# Patient Record
Sex: FEMALE | Race: WHITE | URBAN - METROPOLITAN AREA
[De-identification: names, ages, dates, MRNs, and addresses within clinical notes are randomized per-mention and may not be internally consistent; named-entity substitution may affect disease eponyms.]

---

## 2024-04-04 ENCOUNTER — OFFICE VISIT (OUTPATIENT)
Age: 44
End: 2024-04-04
Payer: COMMERCIAL

## 2024-04-04 VITALS
DIASTOLIC BLOOD PRESSURE: 78 MMHG | HEART RATE: 98 BPM | WEIGHT: 201 LBS | BODY MASS INDEX: 30.46 KG/M2 | SYSTOLIC BLOOD PRESSURE: 124 MMHG | HEIGHT: 68 IN | OXYGEN SATURATION: 95 % | RESPIRATION RATE: 16 BRPM

## 2024-04-04 DIAGNOSIS — G43.101 MIGRAINE WITH AURA AND WITH STATUS MIGRAINOSUS, NOT INTRACTABLE: Primary | ICD-10-CM

## 2024-04-04 PROCEDURE — 99204 OFFICE O/P NEW MOD 45 MIN: CPT | Performed by: PSYCHIATRY & NEUROLOGY

## 2024-04-04 RX ORDER — ATOMOXETINE 60 MG/1
60 CAPSULE ORAL DAILY
COMMUNITY
Start: 2024-03-16

## 2024-04-04 RX ORDER — QUETIAPINE FUMARATE 50 MG/1
50 TABLET, FILM COATED ORAL DAILY
COMMUNITY
Start: 2024-03-16

## 2024-04-04 RX ORDER — BUPROPION HYDROCHLORIDE 100 MG/1
TABLET, EXTENDED RELEASE ORAL
COMMUNITY
Start: 2024-03-16

## 2024-04-04 RX ORDER — SUMATRIPTAN 100 MG/1
TABLET, FILM COATED ORAL
Qty: 9 TABLET | Refills: 3 | Status: SHIPPED | OUTPATIENT
Start: 2024-04-04

## 2024-04-04 RX ORDER — ALPRAZOLAM 0.5 MG/1
0.5 TABLET ORAL 3 TIMES DAILY PRN
COMMUNITY
Start: 2024-02-07

## 2024-04-04 ASSESSMENT — PATIENT HEALTH QUESTIONNAIRE - PHQ9
SUM OF ALL RESPONSES TO PHQ QUESTIONS 1-9: 0
SUM OF ALL RESPONSES TO PHQ QUESTIONS 1-9: 0
SUM OF ALL RESPONSES TO PHQ9 QUESTIONS 1 & 2: 0
SUM OF ALL RESPONSES TO PHQ QUESTIONS 1-9: 0
2. FEELING DOWN, DEPRESSED OR HOPELESS: NOT AT ALL
SUM OF ALL RESPONSES TO PHQ QUESTIONS 1-9: 0
1. LITTLE INTEREST OR PLEASURE IN DOING THINGS: NOT AT ALL

## 2024-04-04 NOTE — PATIENT INSTRUCTIONS
[place], [place] [facility] Test Result Communication    Test results are available in [employer].  [facility] [employer] is the patient portal into our electronic health record.  This feature allows patients to see diagnostic test results, immunizations, allergies, past medical and surgical history, current medications, and send messages directly to providers.  Our team members at the  can provide additional information and assist with registration.  The [employer] support team can be reached at [phone].    In some cases, a provider might need time to explain the results in detail during a follow-up appointment.  This might include additional information or context that will help patients understand the reason for next steps in the plan of care recommended by their provider.    If a patient chooses to receive diagnostic testing at an imaging center outside of the [facility] network, it is the patient's responsibility to bring the imaging report and disc to their [facility] follow-up appointment.    If the test results reveal anything that is particularly noteworthy, we will contact you to discuss the matter and, if necessary, schedule a follow-up appointment at an earlier date.    If you have not received your test results by [employer] or other communication within 7 days, please contact our office.  An inquiry can be sent to your provider using [employer].  Alternatively, appointments can be scheduled via telephone to review results.  If a follow-up appointment to review results has not been scheduled, Our [facility] office can be reached at [phone].  For appointments at our [place] or [place] office, please call [phone].       PRESCRIPTION REFILL POLICY    [facility]   Statement to Patients  April 1, 2014      In an effort to ensure the large volume of patient prescription refills is processed in the most efficient and expeditious
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
## 2024-06-03 ENCOUNTER — TELEPHONE (OUTPATIENT)
Age: 44
End: 2024-06-03

## 2024-06-03 DIAGNOSIS — G43.101 MIGRAINE WITH AURA AND WITH STATUS MIGRAINOSUS, NOT INTRACTABLE: Primary | ICD-10-CM

## 2024-06-03 RX ORDER — RIMEGEPANT SULFATE 75 MG/75MG
TABLET, ORALLY DISINTEGRATING ORAL
Qty: 8 TABLET | Refills: 1 | Status: SHIPPED | OUTPATIENT
Start: 2024-06-03 | End: 2024-06-04 | Stop reason: ALTCHOICE

## 2024-06-03 NOTE — TELEPHONE ENCOUNTER
Per pt- the Nurtec is $800 out of pocket for 30 day PRN supply. Advised I will put samples aside for her and  will  tomorrow.   In the meantime would like to try Maxalt as she believes insurance will cover that while we wait for [facility] PA.   Ok to send in Rizatriptan/Maxalt?

## 2024-06-03 NOTE — TELEPHONE ENCOUNTER
Please advise.     New pt saw Dr Iverson 4/4/24 for worsening migraines. Previously had Nurtec samples from another provider which were effective. Imitrex 100 mg PRN is not helping her migraine.

## 2024-06-03 NOTE — TELEPHONE ENCOUNTER
Patient would like to discuss her getting prescribed a different medication other than Sumatriptan because she feels it's not working and she is dealing with a severe migraine as we speak.    Merced is dealing with blurry vision / seeing spots / nausea / throbbing pain.

## 2024-06-04 ENCOUNTER — TELEPHONE (OUTPATIENT)
Age: 44
End: 2024-06-04

## 2024-06-04 RX ORDER — RIZATRIPTAN BENZOATE 10 MG/1
10 TABLET ORAL AS NEEDED
Qty: 9 TABLET | Refills: 3 | Status: SHIPPED | OUTPATIENT
Start: 2024-06-04

## 2024-06-04 NOTE — TELEPHONE ENCOUNTER
Re: Nurte    Rcvd PA in [facility] but pt has not tried and failed 2 triptans. Sent update to nurse as PA will deny.

## 2024-07-08 ENCOUNTER — OFFICE VISIT (OUTPATIENT)
Age: 44
End: 2024-07-08
Payer: COMMERCIAL

## 2024-07-08 ENCOUNTER — TELEPHONE (OUTPATIENT)
Age: 44
End: 2024-07-08

## 2024-07-08 VITALS
DIASTOLIC BLOOD PRESSURE: 96 MMHG | HEART RATE: 93 BPM | RESPIRATION RATE: 20 BRPM | SYSTOLIC BLOOD PRESSURE: 144 MMHG | OXYGEN SATURATION: 98 %

## 2024-07-08 DIAGNOSIS — G43.101 MIGRAINE WITH AURA AND WITH STATUS MIGRAINOSUS, NOT INTRACTABLE: Primary | ICD-10-CM

## 2024-07-08 PROCEDURE — 99214 OFFICE O/P EST MOD 30 MIN: CPT

## 2024-07-08 RX ORDER — TOPIRAMATE 25 MG/1
TABLET ORAL
Qty: 90 TABLET | Refills: 0 | Status: SHIPPED | OUTPATIENT
Start: 2024-07-08

## 2024-07-08 RX ORDER — TOPIRAMATE 100 MG/1
100 TABLET, FILM COATED ORAL
Qty: 90 TABLET | Refills: 1 | Status: SHIPPED | OUTPATIENT
Start: 2024-07-29

## 2024-07-08 RX ORDER — RIMEGEPANT SULFATE 75 MG/75MG
75 TABLET, ORALLY DISINTEGRATING ORAL PRN
Qty: 8 TABLET | Refills: 5 | Status: SHIPPED | OUTPATIENT
Start: 2024-07-08

## 2024-07-08 ASSESSMENT — ENCOUNTER SYMPTOMS
NAUSEA: 1
PHOTOPHOBIA: 1

## 2024-07-08 NOTE — TELEPHONE ENCOUNTER
RE:Nurtec 75 mg tablet prior authorization request sent to jessica through epic    Case ID: [id number]     PA Case: [id number]     Status is approved     Coverage Starts on: 7/8/2024     Coverage Ends on: 7/8/2025     Approval letter scanned to media     Detail in the referral tab     FYI to nurse

## 2024-07-08 NOTE — PROGRESS NOTES
The nurtec works better than the triptans   The Triptans still don't help all that much, she is not able to get back to her task with the nurtec she can have like a 30 min time out and she is back to doing her task     Uses the rescue maybe 1-2 a week, she does other things like her environment, wearing ear plugs     
for Migraine May repeat in 2 hours if needed 9 tablet 3    atomoxetine (STRATTERA) 60 MG capsule Take 1 capsule by mouth daily      QUEtiapine (SEROQUEL) 50 MG tablet Take 1 tablet by mouth daily       No current facility-administered medications for this visit.        Social History     Tobacco Use   Smoking Status Never    Passive exposure: Never   Smokeless Tobacco Never       Past Medical History:   Diagnosis Date    Migraines        Past Surgical History:   Procedure Laterality Date     SECTION         History reviewed. No pertinent family history.    Social History     Socioeconomic History    Marital status:      Spouse name: None    Number of children: None    Years of education: None    Highest education level: None   Tobacco Use    Smoking status: Never     Passive exposure: Never    Smokeless tobacco: Never   Vaping Use    Vaping Use: Never used   Substance and Sexual Activity    Alcohol use: Yes     Comment: rarely    Drug use: Yes     Types: Marijuana (Weed)       Review of Systems   Constitutional: Negative.    Eyes:  Positive for photophobia and visual disturbance.   Gastrointestinal:  Positive for nausea.   Neurological:  Positive for headaches.         Remainder of comprehensive review is negative.     Physical Exam :    BP (!) 144/96 (Site: Right Upper Arm, Position: Sitting, Cuff Size: Large Adult)   Pulse 93   Resp 20   SpO2 98%     General: Well defined, nourished, and groomed individual in no acute distress.    Neck: Supple, nontender, no bruits, no pain with resistance to active range of motion.    Musculoskeletal: Extremities revealed no edema and had full range of motion of joints.    Psych: Good mood and bright affect    NEUROLOGICAL EXAMINATION:    Mental Status: Alert and oriented to person, place, and time    Cranial Nerves:    II, III, IV, VI: Visual acuity grossly intact. Visual fields are normal.    Pupils are equal, round, and reactive to light and accommodation.

## 2024-08-26 ENCOUNTER — OFFICE VISIT (OUTPATIENT)
Age: 44
End: 2024-08-26
Payer: COMMERCIAL

## 2024-08-26 DIAGNOSIS — F84.0 AUTISTIC BEHAVIOR: ICD-10-CM

## 2024-08-26 DIAGNOSIS — R47.89 WORD FINDING DIFFICULTY: ICD-10-CM

## 2024-08-26 DIAGNOSIS — F32.A ANXIETY AND DEPRESSION: ICD-10-CM

## 2024-08-26 DIAGNOSIS — G31.84 MILD COGNITIVE IMPAIRMENT: Primary | ICD-10-CM

## 2024-08-26 DIAGNOSIS — R41.89 COGNITIVE DECLINE: ICD-10-CM

## 2024-08-26 DIAGNOSIS — F41.9 ANXIETY AND DEPRESSION: ICD-10-CM

## 2024-08-26 PROCEDURE — 90791 PSYCH DIAGNOSTIC EVALUATION: CPT | Performed by: CLINICAL NEUROPSYCHOLOGIST

## 2024-08-26 NOTE — PROGRESS NOTES
[facility] NEUROSCIENCE [facility] MEDICAL/EMERGENCY CENTER  NEUROLOGY CLINIC   [address]   [place] [address]   [phone] Office   [phone] Fax      Neuropsychology    Initial Diagnostic Interview Note      Referral:  [facility], Pcp    Merced Meza is a 44 y.o. right handed   female who was accompanied by her spouse to the initial clinical interview on 2024.  Please refer to her medical records for details pertaining to her history.   At the start of the appointment, I reviewed the patient's [facility] Epic Chart (including Media scanned in from previous providers) for the active Problem List, all pertinent Past Medical Hx, medications, recent radiologic and laboratory findings.  In addition, I reviewed pt's documented Immunization Record and Encounter History.     Chief Complaint: New patient, establish care, for neurocognitive and psychologic concerns, as outlined above.     The patient  has a past medical history of Migraines.    She  has a past surgical history that includes  section.      Patient was last seen 2024 by Dr. Iverson for worsening headaches for the last several years.  She was having about 2 a month that was lasting about 1 to 3 days each.  These headaches were occurring on the left side of her head and behind her left eye that was stabbing in nature with phonophobia, photophobia, nausea, smell sensitivity.  The patient sometimes has an aura of pulsing lights and has to go to bed in order to relieve these headaches.  Dr. Iverson ordered the patient to try Imitrex 100 mg for rescue therapy however, the patient messaged us Yue 3 stating that it was not helping to relieve her headaches.  Maxalt was sent in place of it on Yue 3.  Today the patient states that unfortunately the Maxalt only dulls the pain she still cannot do her daily activities.  She says that she is having most day dull headaches.  She is

## 2024-08-27 ENCOUNTER — PROCEDURE VISIT (OUTPATIENT)
Age: 44
End: 2024-08-27

## 2024-08-27 DIAGNOSIS — G31.84 MILD COGNITIVE IMPAIRMENT: Primary | ICD-10-CM

## 2024-10-02 ENCOUNTER — TELEPHONE (OUTPATIENT)
Age: 44
End: 2024-10-02

## 2024-10-02 NOTE — TELEPHONE ENCOUNTER
Spoke with patient regarding returning multiple questionnaires. She has been out of town. I informed the patient as long as she gets those to me by Monday, I can get her report completed prior to her follow up. Patient agreed to get them in ASAP.

## 2024-10-08 ENCOUNTER — TELEPHONE (OUTPATIENT)
Age: 44
End: 2024-10-08

## 2024-10-08 NOTE — TELEPHONE ENCOUNTER
Spoke with patient regarding questionnaires. The patient stated she is struggling to answer the questions, but I kept informing her the questionnaires are a rating/opinion of herself and daily behaviors so I am unable to assist her with an answer. Patient was initially supposed to take the questionnaires to therapist for guidance but she unfortunately has not had time due to travel. She stated she will try to complete them, but I informed her if she cannot get through them that I can turn her chart in without them.

## 2024-10-11 ENCOUNTER — TELEMEDICINE (OUTPATIENT)
Age: 44
End: 2024-10-11
Payer: COMMERCIAL

## 2024-10-11 DIAGNOSIS — G31.84 MILD COGNITIVE IMPAIRMENT: Primary | ICD-10-CM

## 2024-10-11 DIAGNOSIS — F41.9 SEVERE ANXIETY: ICD-10-CM

## 2024-10-11 DIAGNOSIS — F42.8 OTHER OBSESSIVE-COMPULSIVE DISORDER: ICD-10-CM

## 2024-10-11 DIAGNOSIS — F44.9 CONVERSION DISORDER: ICD-10-CM

## 2024-10-11 DIAGNOSIS — R47.89 WORD FINDING DIFFICULTY: ICD-10-CM

## 2024-10-11 DIAGNOSIS — F45.0 SOMATIZATION DISORDER: ICD-10-CM

## 2024-10-11 DIAGNOSIS — F90.0 ATTENTION DEFICIT HYPERACTIVITY DISORDER (ADHD), INATTENTIVE TYPE, MODERATE: ICD-10-CM

## 2024-10-11 DIAGNOSIS — F84.0 AUTISM SPECTRUM DISORDER REQUIRING SUPPORT (LEVEL 1): ICD-10-CM

## 2024-10-11 PROCEDURE — 96132 NRPSYC TST EVAL PHYS/QHP 1ST: CPT | Performed by: CLINICAL NEUROPSYCHOLOGIST

## 2024-10-11 NOTE — PROGRESS NOTES
Mreced Meza, was evaluated through a synchronous (real-time) audio-video encounter. The patient (or guardian if applicable) is aware that this is a billable service, which includes applicable co-pays. This Virtual Visit was conducted with patient's (and/or legal guardian's) consent. Patient identification was verified, and a caregiver was present when appropriate.   The patient was located at Home: [address] [place] [id number]  Provider was located at Facility (Appt Dept): [address] [place] [address]  [address]  [place] [address]  Confirm you are appropriately licensed, registered, or certified to deliver care in the state where the patient is located as indicated above. If you are not or unsure, please re-schedule the visit: Yes, I confirm.     Merced Meza (:  1980) is a Established patient, presenting virtually for evaluation of the following:        Chief Complaint:  Follow up to discuss test results with this established patient related to neurocognitive and psychologic functioning, cognitive concerns and emotional/mood concerns as outlined below.     A neuropsychological evaluation was completed with the patient on 2024     Prior to seeing the patient for today's visit, I reviewed pertinent records, including the previously completed report, the records in Epic, and any updated visits from other providers since the patient's last visit.     During today's appointment I administered the following measures: NMSE, Fluency.  Exam impaired in that speech worsens with heightened anxiety.          I provided feedback services related to the previously completed report. Attendees included: Patient. Education was provided regarding my diagnostic impressions, and we discussed treatment plan/options. Attendees were provided with the opportunity to ask questions, which were answered to the best of my ability.     We discussed, in detail, the following:     This patient generated

## 2024-10-21 NOTE — PROGRESS NOTES
Merced Meza is a 44 y.o. female who presents with the following  Chief Complaint   Patient presents with    Follow-up     Migraines, med eval on topamax, nurtec      Last office visit note  HPI  Patient is here today with her  for migraine follow-up.  Patient was last seen April 4, 2024 by Dr. Iverson for worsening headaches for the last several years.  She was having about 2 a month that was lasting about 1 to 3 days each.  These headaches were occurring on the left side of her head and behind her left eye that was stabbing in nature with phonophobia, photophobia, nausea, smell sensitivity.  The patient sometimes has an aura of pulsing lights and has to go to bed in order to relieve these headaches.  Dr. Iverson ordered the patient to try Imitrex 100 mg for rescue therapy however, the patient messaged us Yue 3 stating that it was not helping to relieve her headaches.  Maxalt was sent in place of it on Yue 3.  Today the patient states that unfortunately the Maxalt only dulls the pain she still cannot do her daily activities.  She says that she is having most day dull headaches.  She is having about 2 severe migraines a month that last 2 to 3 days.  Tried/failed: Prozac, Wellbutrin, Xanax,  nortriptyline, metoprolol, propranolol, is currently on Seroquel.    Patient is open to starting a preventative medication.  We will start with Topamax 25 mg in the customary fashion to 100 mg nightly.  Sent a separate 100 mg tablet for when she reaches that dose.  Discussed potential side effects.  Nurtec ODT 75 mg for rescue therapy.  Patient has tried and failed sumatriptan and rizatriptan.  Follow-up in 3 months      Today's office visit note  HPI  Patient is here today for migraine follow-up.  Since the patient was here last she did start Topamax for migraine prevention and went up to 100 mg nightly.  Unfortunately, the patient says that she experienced significant hair loss with the starting of Topamax.  She says

## 2024-10-22 ENCOUNTER — OFFICE VISIT (OUTPATIENT)
Age: 44
End: 2024-10-22
Payer: COMMERCIAL

## 2024-10-22 VITALS
SYSTOLIC BLOOD PRESSURE: 140 MMHG | RESPIRATION RATE: 20 BRPM | DIASTOLIC BLOOD PRESSURE: 80 MMHG | HEART RATE: 105 BPM | OXYGEN SATURATION: 97 %

## 2024-10-22 DIAGNOSIS — G43.101 MIGRAINE WITH AURA AND WITH STATUS MIGRAINOSUS, NOT INTRACTABLE: Primary | ICD-10-CM

## 2024-10-22 PROCEDURE — 99215 OFFICE O/P EST HI 40 MIN: CPT

## 2024-10-22 RX ORDER — FREMANEZUMAB-VFRM 225 MG/1.5ML
225 INJECTION SUBCUTANEOUS
Qty: 1.5 ML | Refills: 5 | Status: SHIPPED | OUTPATIENT
Start: 2024-10-22

## 2024-10-22 RX ORDER — PROPRANOLOL HYDROCHLORIDE 10 MG/1
10 TABLET ORAL 2 TIMES DAILY PRN
COMMUNITY
Start: 2024-08-29

## 2024-10-22 RX ORDER — RIZATRIPTAN BENZOATE 10 MG/1
10 TABLET ORAL AS NEEDED
Qty: 9 TABLET | Refills: 5 | Status: SHIPPED | OUTPATIENT
Start: 2024-10-22

## 2024-10-22 RX ORDER — BUSPIRONE HYDROCHLORIDE 15 MG/1
15 TABLET ORAL 3 TIMES DAILY
COMMUNITY

## 2024-10-22 NOTE — PROGRESS NOTES
Maybe 3 weeks ago she dropped back down to the 50 mg due to her hair falling out   She was thinking about the medication since that was the latest thing that was changed     She ran out of the Nurtec

## 2024-11-22 ENCOUNTER — TELEPHONE (OUTPATIENT)
Age: 44
End: 2024-11-22

## 2024-11-22 DIAGNOSIS — G43.101 MIGRAINE WITH AURA AND WITH STATUS MIGRAINOSUS, NOT INTRACTABLE: ICD-10-CM

## 2024-11-22 NOTE — TELEPHONE ENCOUNTER
Requesting a call back concerning PA for RX   Fremanezumab-vfrm (AJOVY) 225 MG/1.5ML SOAJ stated she is out of the medication and need a refill or simple.

## 2024-11-24 RX ORDER — FREMANEZUMAB-VFRM 225 MG/1.5ML
225 INJECTION SUBCUTANEOUS
Qty: 1.5 ML | Refills: 5 | Status: SHIPPED | OUTPATIENT
Start: 2024-11-24

## 2024-11-26 ENCOUNTER — TELEPHONE (OUTPATIENT)
Age: 44
End: 2024-11-26

## 2024-11-26 ENCOUNTER — PATIENT MESSAGE (OUTPATIENT)
Age: 44
End: 2024-11-26

## 2024-11-27 NOTE — TELEPHONE ENCOUNTER
RE:Ajovy 225 mg 1.5 ml auto injector pa request sent to [facility] via cmm     (Key: [id number])    Approved today by [employer] 2017    PA Case: [id number]    Status: Approved    Coverage Starts on: 11/27/2024     Coverage Ends on: 2/25/2025     Authorization Expiration Date: 2/24/2025se ID #: [id number]    FYI to nurse

## 2025-01-10 DIAGNOSIS — G43.101 MIGRAINE WITH AURA AND WITH STATUS MIGRAINOSUS, NOT INTRACTABLE: ICD-10-CM

## 2025-01-13 RX ORDER — TOPIRAMATE 50 MG/1
50 TABLET, FILM COATED ORAL
Qty: 90 TABLET | Refills: 0 | Status: SHIPPED | OUTPATIENT
Start: 2025-01-13

## 2025-03-03 NOTE — PROGRESS NOTES
Merced Meza is a 44 y.o. female who presents with the following  Chief Complaint   Patient presents with    Follow-up     Migraines, started on Ajovy      Last office visit note  HPI  Patient is here today for migraine follow-up.  Since the patient was here last she did start Topamax for migraine prevention and went up to 100 mg nightly.  Unfortunately, the patient says that she experienced significant hair loss with the starting of Topamax.  She says that she did lower the dose from 100 mg nightly to 50 mg nightly 3 weeks ago.  She says that the Topamax had helped to reduce her headache days from a daily headache to 3 to 4 days a week.  She currently has a migraine that started 3 days ago. The patient sometimes has an aura of pulsing lights and has to go to bed in order to relieve these headaches.  Ideally the patient would like to stop Topamax altogether.  She is interested in taking something else to prevent migraines from returning.  She has been using rizatriptan (only dulls) or Nurtec ODT for rescue therapy.     Tried/failed: Prozac, Wellbutrin, Xanax,  nortriptyline, metoprolol, propranolol, is currently on Seroquel, currently on Topamax (hair loss), Sumatriptan, Rizatriptan (current med) Nurtec (current med).    Advised the patient that we will attempt to get Ajovy 225 mg monthly approved for migraine prevention.  Discussed potential side effects.  Did give the patient a sample to start and a co-pay card.     Patient may go ahead and stop Topamax 50 mg daily or continue taking it until she knows that the Ajovy is helpful.     Continue using Nurtec ODT 75 mg and rizatriptan 10 mg as needed for rescue therapy.  Patient will return for follow-up in 4 months.      Today's office visit note  HPI  Patient is here today for migraine follow-up.  Patient has been using Ajovy for migraine prevention but unfortunately, the last 2 months she has received them about 2 weeks late from the pharmacy.  She believes

## 2025-03-04 ENCOUNTER — OFFICE VISIT (OUTPATIENT)
Age: 45
End: 2025-03-04
Payer: COMMERCIAL

## 2025-03-04 VITALS
OXYGEN SATURATION: 99 % | SYSTOLIC BLOOD PRESSURE: 134 MMHG | DIASTOLIC BLOOD PRESSURE: 86 MMHG | RESPIRATION RATE: 20 BRPM | HEART RATE: 76 BPM

## 2025-03-04 DIAGNOSIS — G43.101 MIGRAINE WITH AURA AND WITH STATUS MIGRAINOSUS, NOT INTRACTABLE: Primary | ICD-10-CM

## 2025-03-04 PROCEDURE — 99214 OFFICE O/P EST MOD 30 MIN: CPT

## 2025-03-04 RX ORDER — FLUOXETINE 10 MG/1
10 CAPSULE ORAL DAILY
COMMUNITY
Start: 2025-02-17

## 2025-03-04 RX ORDER — METHYLPHENIDATE HYDROCHLORIDE 20 MG/1
20 CAPSULE, EXTENDED RELEASE ORAL DAILY
COMMUNITY
Start: 2025-01-16

## 2025-03-04 RX ORDER — FREMANEZUMAB-VFRM 225 MG/1.5ML
225 INJECTION SUBCUTANEOUS
Qty: 1.68 ML | Refills: 5 | Status: ACTIVE | OUTPATIENT
Start: 2025-03-04

## 2025-03-04 NOTE — PROGRESS NOTES
Migraines- hard time getting the Ajovy getting the pharmacy ordered and getting it   She thinks she is about 2 weeks behind now on getting it that was in February   Frequency- has been increasing but her work load has already picked up as well   Having like 3 to 4 a week going through her rescue meds more quickly than usual

## 2025-07-18 ENCOUNTER — OFFICE VISIT (OUTPATIENT)
Age: 45
End: 2025-07-18
Payer: COMMERCIAL

## 2025-07-18 VITALS — RESPIRATION RATE: 20 BRPM | SYSTOLIC BLOOD PRESSURE: 138 MMHG | DIASTOLIC BLOOD PRESSURE: 88 MMHG

## 2025-07-18 DIAGNOSIS — G43.101 MIGRAINE WITH AURA AND WITH STATUS MIGRAINOSUS, NOT INTRACTABLE: ICD-10-CM

## 2025-07-18 DIAGNOSIS — G43.101 MIGRAINE WITH AURA AND WITH STATUS MIGRAINOSUS, NOT INTRACTABLE: Primary | ICD-10-CM

## 2025-07-18 PROCEDURE — 99214 OFFICE O/P EST MOD 30 MIN: CPT

## 2025-07-18 RX ORDER — UBROGEPANT 100 MG/1
100 TABLET ORAL PRN
Qty: 16 TABLET | Refills: 5 | Status: SHIPPED | OUTPATIENT
Start: 2025-07-18

## 2025-07-18 RX ORDER — RIMEGEPANT SULFATE 75 MG/75MG
75 TABLET, ORALLY DISINTEGRATING ORAL EVERY OTHER DAY
Qty: 16 TABLET | Refills: 5 | Status: SHIPPED | OUTPATIENT
Start: 2025-07-18

## 2025-07-18 NOTE — PROGRESS NOTES
Merced Meza is a 45 y.o. female who presents with the following  Chief Complaint   Patient presents with    Follow-up     Migraines, changed to Ajovy syringe      Last office visit note  HPI  Patient is here today for migraine follow-up.  Patient has been using Ajovy for migraine prevention but unfortunately, the last 2 months she has received them about 2 weeks late from the pharmacy.  She believes she has had 5 injections so far.  Her workload has increased recently and she is having about 3 or 4 headache days a week.  She does feel that the Ajovy is painful to use.  She did stop taking Topamax due to hair loss and feels like that has gotten better since being off the medication.  She is using Nurtec ODT for rescue therapy and says that it works very well.  She says that it dulls the headache in about 20 minutes and within 40 minutes the migraine is gone.  He also has rizatriptan 10 mg as backup rescue therapy if needed.     Tried/failed: Prozac (current med), Wellbutrin, Xanax,  nortriptyline, metoprolol, propranolol (current med), is currently on Seroquel, Topamax (hair loss), Sumatriptan, Rizatriptan (current med) Nurtec (current med).    Will have the patient try the Ajovy 225 mg syringe rather than autoinjector to see if this is less painful for her.  We will also [facility] the [facility] of [facility] due to [facility] having trouble receiving the medication and time.  Continue using Nurtec ODT 75 mg and rizatriptan 10 mg for rescue therapy.  Follow-up in 4 months      Today's office visit note  HPI  Patient is here today for migraine follow-up.  At the last visit her the patient complained about Johnny being painful to use she had also stopped using Topamax due to hair loss.  She was using Nurtec ODT for rescue therapy and rizatriptan as a backup rescue therapy.  We attempted to change the Ajovy from the autoinjector to the syringe to see if this would be less painful for the patient but

## 2025-07-18 NOTE — PROGRESS NOTES
Haven't done the July injection   She doesn't do well with the time frame to pick it up and keeping it in the refrigerator and she can't give it to herself she doesn't want to keep taking it     Migraines- They were doing pretty good and now the last 2 weeks have been really draining   Her job at worked as changed, she has more ppl under her now and machines running and more chaos in general in the room     Rescue is used 3 to 4 times a month   Depending on the headache depends on which one she uses   She might use tylenol in place of the rescue meds if it isnt as bad   If they are really bad she will use the nurtec first for the mild ones she will use the triptan rescue   She will keep the Nurtec on hand for the really bad ones it gets rid of them so much quicker

## 2025-07-29 RX ORDER — RIMEGEPANT SULFATE 75 MG/75MG
TABLET, ORALLY DISINTEGRATING ORAL
Qty: 8 TABLET | Refills: 5 | OUTPATIENT
Start: 2025-07-29